# Patient Record
(demographics unavailable — no encounter records)

---

## 2024-11-09 NOTE — PROCEDURE
[FreeTextEntry3] : Bilateral breast ultrasound  The patient has a history for breast cyst  Four-quadrant survey the left breast demonstrates no suspicious findings  There is a vertically oriented complex cyst in the upper inner right breast  After informed consent obtained, 1% lidocaine was infiltrated to the skin and the cyst was aspirated to completion  BI-RADS 2

## 2024-11-09 NOTE — HISTORY OF PRESENT ILLNESS
[FreeTextEntry1] : Referred by Dr. Gustavo Palm GYN.  Patient presents bilateral breast cysts.  Mammogram recently performed at [facility].  Patient denies palpable mass, breast pain, redness on the skin, nipple discharge.  Paternal grandmother at age 83 had breast cancer.

## 2024-11-09 NOTE — ASSESSMENT
[FreeTextEntry1] : Complex cyst upper inner right breast  The cyst was aspirated to completion  No residual mass was noted  The patient was reassured  Follow-up 6 months  A total of 30 minutes was spent in consultation

## 2024-11-09 NOTE — HISTORY OF PRESENT ILLNESS
[FreeTextEntry1] : Referred by Dr. Gustavo Palm GYN.  Patient presents bilateral breast cysts.  Mammogram recently performed at [facility].  Patient denies palpable mass, breast pain, redness on the skin, nipple discharge.  Paternal grandmother at age 83 had breast cancer.